# Patient Record
Sex: FEMALE | HISPANIC OR LATINO | Employment: UNEMPLOYED | URBAN - METROPOLITAN AREA
[De-identification: names, ages, dates, MRNs, and addresses within clinical notes are randomized per-mention and may not be internally consistent; named-entity substitution may affect disease eponyms.]

---

## 2022-06-22 ENCOUNTER — APPOINTMENT (OUTPATIENT)
Dept: CT IMAGING | Facility: CLINIC | Age: 32
End: 2022-06-22
Attending: EMERGENCY MEDICINE

## 2022-06-22 ENCOUNTER — HOSPITAL ENCOUNTER (EMERGENCY)
Facility: CLINIC | Age: 32
Discharge: HOME | End: 2022-06-22
Attending: EMERGENCY MEDICINE | Admitting: EMERGENCY MEDICINE

## 2022-06-22 VITALS
DIASTOLIC BLOOD PRESSURE: 58 MMHG | RESPIRATION RATE: 18 BRPM | HEART RATE: 108 BPM | SYSTOLIC BLOOD PRESSURE: 99 MMHG | OXYGEN SATURATION: 100 % | WEIGHT: 141.09 LBS | TEMPERATURE: 98.2 F

## 2022-06-22 DIAGNOSIS — N20.0 KIDNEY STONE: ICD-10-CM

## 2022-06-22 DIAGNOSIS — R10.84 ABDOMINAL PAIN, GENERALIZED: ICD-10-CM

## 2022-06-22 LAB
ALBUMIN SERPL BCG-MCNC: 4.4 G/DL (ref 3.5–5.2)
ALBUMIN UR-MCNC: 70 MG/DL
ALP SERPL-CCNC: 61 U/L (ref 35–104)
ALT SERPL W P-5'-P-CCNC: 16 U/L (ref 10–35)
ANION GAP SERPL CALCULATED.3IONS-SCNC: 13 MMOL/L (ref 7–15)
APPEARANCE UR: ABNORMAL
AST SERPL W P-5'-P-CCNC: 20 U/L (ref 10–35)
BASOPHILS # BLD AUTO: 0 10E3/UL (ref 0–0.2)
BASOPHILS NFR BLD AUTO: 0 %
BILIRUB SERPL-MCNC: 0.8 MG/DL
BILIRUB UR QL STRIP: NEGATIVE
BUN SERPL-MCNC: 6.6 MG/DL (ref 6–20)
CALCIUM SERPL-MCNC: 9.2 MG/DL (ref 8.6–10)
CHLORIDE SERPL-SCNC: 104 MMOL/L (ref 98–107)
COLOR UR AUTO: YELLOW
CREAT SERPL-MCNC: 0.6 MG/DL (ref 0.51–0.95)
DEPRECATED HCO3 PLAS-SCNC: 21 MMOL/L (ref 22–29)
EOSINOPHIL # BLD AUTO: 0 10E3/UL (ref 0–0.7)
EOSINOPHIL NFR BLD AUTO: 0 %
ERYTHROCYTE [DISTWIDTH] IN BLOOD BY AUTOMATED COUNT: 11.3 % (ref 10–15)
GFR SERPL CREATININE-BSD FRML MDRD: >90 ML/MIN/1.73M2
GLUCOSE SERPL-MCNC: 133 MG/DL (ref 70–99)
GLUCOSE UR STRIP-MCNC: 50 MG/DL
HCG UR QL: NEGATIVE
HCT VFR BLD AUTO: 41.6 % (ref 35–47)
HGB BLD-MCNC: 14.3 G/DL (ref 11.7–15.7)
HGB UR QL STRIP: NEGATIVE
HOLD SPECIMEN: NORMAL
IMM GRANULOCYTES # BLD: 0.1 10E3/UL
IMM GRANULOCYTES NFR BLD: 1 %
INTERNAL QC OK POCT: NORMAL
KETONES UR STRIP-MCNC: 150 MG/DL
LEUKOCYTE ESTERASE UR QL STRIP: NEGATIVE
LIPASE SERPL-CCNC: 31 U/L (ref 13–60)
LYMPHOCYTES # BLD AUTO: 0.6 10E3/UL (ref 0.8–5.3)
LYMPHOCYTES NFR BLD AUTO: 5 %
MCH RBC QN AUTO: 31.6 PG (ref 26.5–33)
MCHC RBC AUTO-ENTMCNC: 34.4 G/DL (ref 31.5–36.5)
MCV RBC AUTO: 92 FL (ref 78–100)
MONOCYTES # BLD AUTO: 0.6 10E3/UL (ref 0–1.3)
MONOCYTES NFR BLD AUTO: 5 %
MUCOUS THREADS #/AREA URNS LPF: PRESENT /LPF
NEUTROPHILS # BLD AUTO: 10.6 10E3/UL (ref 1.6–8.3)
NEUTROPHILS NFR BLD AUTO: 89 %
NITRATE UR QL: NEGATIVE
NRBC # BLD AUTO: 0 10E3/UL
NRBC BLD AUTO-RTO: 0 /100
PH UR STRIP: 6 [PH] (ref 5–7)
PLATELET # BLD AUTO: 161 10E3/UL (ref 150–450)
POCT KIT EXPIRATION DATE: NORMAL
POCT KIT LOT NUMBER: NORMAL
POTASSIUM SERPL-SCNC: 3.3 MMOL/L (ref 3.4–4.5)
PROT SERPL-MCNC: 7.3 G/DL (ref 6.4–8.3)
RBC # BLD AUTO: 4.52 10E6/UL (ref 3.8–5.2)
RBC URINE: <1 /HPF
SODIUM SERPL-SCNC: 138 MMOL/L (ref 136–145)
SP GR UR STRIP: 1.03 (ref 1–1.03)
SQUAMOUS EPITHELIAL: 10 /HPF
UROBILINOGEN UR STRIP-MCNC: NORMAL MG/DL
WBC # BLD AUTO: 11.9 10E3/UL (ref 4–11)
WBC URINE: 3 /HPF

## 2022-06-22 PROCEDURE — 250N000011 HC RX IP 250 OP 636: Performed by: EMERGENCY MEDICINE

## 2022-06-22 PROCEDURE — 99285 EMERGENCY DEPT VISIT HI MDM: CPT | Mod: 25 | Performed by: EMERGENCY MEDICINE

## 2022-06-22 PROCEDURE — 74177 CT ABD & PELVIS W/CONTRAST: CPT | Mod: 26 | Performed by: RADIOLOGY

## 2022-06-22 PROCEDURE — 99284 EMERGENCY DEPT VISIT MOD MDM: CPT | Performed by: EMERGENCY MEDICINE

## 2022-06-22 PROCEDURE — 83690 ASSAY OF LIPASE: CPT | Performed by: EMERGENCY MEDICINE

## 2022-06-22 PROCEDURE — 96361 HYDRATE IV INFUSION ADD-ON: CPT | Performed by: EMERGENCY MEDICINE

## 2022-06-22 PROCEDURE — 80053 COMPREHEN METABOLIC PANEL: CPT | Performed by: EMERGENCY MEDICINE

## 2022-06-22 PROCEDURE — 85025 COMPLETE CBC W/AUTO DIFF WBC: CPT | Performed by: EMERGENCY MEDICINE

## 2022-06-22 PROCEDURE — 96374 THER/PROPH/DIAG INJ IV PUSH: CPT | Mod: 59 | Performed by: EMERGENCY MEDICINE

## 2022-06-22 PROCEDURE — 81025 URINE PREGNANCY TEST: CPT | Performed by: EMERGENCY MEDICINE

## 2022-06-22 PROCEDURE — 36415 COLL VENOUS BLD VENIPUNCTURE: CPT | Performed by: EMERGENCY MEDICINE

## 2022-06-22 PROCEDURE — 74177 CT ABD & PELVIS W/CONTRAST: CPT

## 2022-06-22 PROCEDURE — 81001 URINALYSIS AUTO W/SCOPE: CPT | Performed by: EMERGENCY MEDICINE

## 2022-06-22 PROCEDURE — 258N000003 HC RX IP 258 OP 636: Performed by: EMERGENCY MEDICINE

## 2022-06-22 PROCEDURE — 250N000009 HC RX 250: Performed by: EMERGENCY MEDICINE

## 2022-06-22 RX ORDER — KETOROLAC TROMETHAMINE 15 MG/ML
10 INJECTION, SOLUTION INTRAMUSCULAR; INTRAVENOUS ONCE
Status: COMPLETED | OUTPATIENT
Start: 2022-06-22 | End: 2022-06-22

## 2022-06-22 RX ORDER — SODIUM CHLORIDE 9 MG/ML
INJECTION, SOLUTION INTRAVENOUS CONTINUOUS
Status: DISCONTINUED | OUTPATIENT
Start: 2022-06-22 | End: 2022-06-22 | Stop reason: HOSPADM

## 2022-06-22 RX ORDER — IOPAMIDOL 755 MG/ML
80 INJECTION, SOLUTION INTRAVASCULAR ONCE
Status: COMPLETED | OUTPATIENT
Start: 2022-06-22 | End: 2022-06-22

## 2022-06-22 RX ORDER — ONDANSETRON 2 MG/ML
4 INJECTION INTRAMUSCULAR; INTRAVENOUS EVERY 30 MIN PRN
Status: DISCONTINUED | OUTPATIENT
Start: 2022-06-22 | End: 2022-06-22 | Stop reason: HOSPADM

## 2022-06-22 RX ADMIN — SODIUM CHLORIDE, PRESERVATIVE FREE 72 ML: 5 INJECTION INTRAVENOUS at 19:05

## 2022-06-22 RX ADMIN — SODIUM CHLORIDE 1000 ML: 9 INJECTION, SOLUTION INTRAVENOUS at 17:27

## 2022-06-22 RX ADMIN — IOPAMIDOL 80 ML: 755 INJECTION, SOLUTION INTRAVENOUS at 19:05

## 2022-06-22 RX ADMIN — KETOROLAC TROMETHAMINE 10 MG: 15 INJECTION, SOLUTION INTRAMUSCULAR; INTRAVENOUS at 18:31

## 2022-06-22 ASSESSMENT — ENCOUNTER SYMPTOMS
ABDOMINAL PAIN: 1
VOMITING: 0
DYSURIA: 0
SORE THROAT: 1
FEVER: 1
NAUSEA: 1
HEMATURIA: 0

## 2022-06-22 NOTE — ED PROVIDER NOTES
[place] EMERGENCY DEPARTMENT ([facility])  6/22/22    History     Chief Complaint   Patient presents with     Abdominal Pain     Dizziness     The history is provided by the patient and medical records.     Nakita Cota is a 31 year old female who presents to the Emergency Department with abdominal pain. Patient reports periumbilical abdominal pain since this morning. Pain radiates to her back. Patient reports she had similar pain about 15 days ago and this resolved on its own after about 2 hours. She has nausea as well as gagging when eating food but no vomiting. Patient endorses subjective fever. Patient's last menstrual period was 5/22. She denies dysuria, hematuria, or vaginal discharge. Patient endorses a little pain in her throat. Patient denies recent illness. No history of abdominal surgery. No recent heavy alcohol use.    Past Medical History  No past medical history on file.  No past surgical history on file.  No current outpatient medications on file.    No Known Allergies  Family History  No family history on file.  Social History       Past medical history, past surgical history, medications, allergies, family history, and social history were reviewed with the patient. No additional pertinent items.       Review of Systems   Constitutional: Positive for fever (subjective).   HENT: Positive for sore throat.    Gastrointestinal: Positive for abdominal pain (periumbilical) and nausea. Negative for vomiting.   Genitourinary: Negative for dysuria, hematuria and vaginal discharge.   All other systems reviewed and are negative.    A complete review of systems was performed with pertinent positives and negatives noted in the HPI, and all other systems negative.    Physical Exam   BP: 99/58  Pulse: 108  Temp: 98.2  F (36.8  C)  Resp: 18  Weight: 64 kg (141 lb 1.5 oz)  SpO2: 100 %  Physical Exam  Constitutional:       General: She is not in acute distress.     Appearance: She is  well-developed. She is not ill-appearing, toxic-appearing or diaphoretic.   HENT:      Head: Normocephalic and atraumatic.   Cardiovascular:      Rate and Rhythm: Normal rate and regular rhythm.      Heart sounds: Normal heart sounds.   Pulmonary:      Effort: Pulmonary effort is normal. No respiratory distress.      Breath sounds: Normal breath sounds.   Abdominal:      General: There is no distension.      Palpations: Abdomen is soft.      Tenderness: There is abdominal tenderness in the suprapubic area. There is no guarding or rebound.      Hernia: No hernia is present.   Musculoskeletal:         General: No tenderness.      Cervical back: Normal range of motion.   Skin:     General: Skin is warm and dry.   Neurological:      Mental Status: She is alert and oriented to person, place, and time.   Psychiatric:         Behavior: Behavior normal.         Thought Content: Thought content normal.         ED Course   5:13 PM  The patient was seen and examined by Iraida Wheat MD in Room ED12.      Procedures       The medical record was reviewed and interpreted.  Current labs reviewed and interpreted.     Results for orders placed or performed during the hospital encounter of 06/22/22   CT Abdomen Pelvis w Contrast     Status: None (Preliminary result)    Impression    RESIDENT PRELIMINARY INTERPRETATION  IMPRESSION:   Two nonobstructive 5 mm renal calculi in the interpolar left kidney.  No other acute findings.   [place] Draw     Status: None    Narrative    The following orders were created for panel order [place] Draw.  Procedure                               Abnormality         Status                     ---------                               -----------         ------                     Extra Blue Top Tube[[id number]]                              Final result               Extra Red Top Tube[[id number]]                               Final result               Extra Green Top (Lithium...[[id number]]                       Final result               Extra Purple Top Tube[[id number]]                            Final result                 Please view results for these tests on the individual orders.   Extra Blue Top Tube     Status: None   Result Value Ref Range    Hold Specimen JIC    Extra Red Top Tube     Status: None   Result Value Ref Range    Hold Specimen JIC    Extra Green Top (Lithium Heparin) Tube     Status: None   Result Value Ref Range    Hold Specimen JIC    Extra Purple Top Tube     Status: None   Result Value Ref Range    Hold Specimen JIC    Comprehensive metabolic panel     Status: Abnormal   Result Value Ref Range    Sodium 138 136 - 145 mmol/L    Potassium 3.3 (L) 3.4 - 4.5 mmol/L    Creatinine 0.60 0.51 - 0.95 mg/dL    Urea Nitrogen 6.6 6.0 - 20.0 mg/dL    Chloride 104 98 - 107 mmol/L    Carbon Dioxide (CO2) 21 (L) 22 - 29 mmol/L    Anion Gap 13 7 - 15 mmol/L    Glucose 133 (H) 70 - 99 mg/dL    Calcium 9.2 8.6 - 10.0 mg/dL    Protein Total 7.3 6.4 - 8.3 g/dL    Albumin 4.4 3.5 - 5.2 g/dL    Bilirubin Total 0.8 <=1.2 mg/dL    Alkaline Phosphatase 61 35 - 104 U/L    AST 20 10 - 35 U/L    ALT 16 10 - 35 U/L    GFR Estimate >90 >60 mL/min/1.73m2   Lipase     Status: Normal   Result Value Ref Range    Lipase 31 13 - 60 U/L   UA with Microscopic reflex to Culture     Status: Abnormal    Specimen: Urine, Clean Catch   Result Value Ref Range    Color Urine Yellow Colorless, Straw, Light Yellow, Yellow    Appearance Urine Slightly Cloudy (A) Clear    Glucose Urine 50  (A) Negative mg/dL    Bilirubin Urine Negative Negative    Ketones Urine 150  (A) Negative mg/dL    Specific Gravity Urine 1.033 1.003 - 1.035    Blood Urine Negative Negative    pH Urine 6.0 5.0 - 7.0    Protein Albumin Urine 70  (A) Negative mg/dL    Urobilinogen Urine Normal Normal, 2.0 mg/dL    Nitrite Urine Negative Negative    Leukocyte Esterase Urine Negative Negative    Mucus Urine Present (A) None Seen /LPF    RBC Urine <1 <=2 /HPF    WBC  Urine 3 <=5 /HPF    Squamous Epithelials Urine 10 (H) <=1 /HPF    Narrative    Urine Culture not indicated   CBC with platelets and differential     Status: Abnormal   Result Value Ref Range    WBC Count 11.9 (H) 4.0 - 11.0 10e3/uL    RBC Count 4.52 3.80 - 5.20 10e6/uL    Hemoglobin 14.3 11.7 - 15.7 g/dL    Hematocrit 41.6 35.0 - 47.0 %    MCV 92 78 - 100 fL    MCH 31.6 26.5 - 33.0 pg    MCHC 34.4 31.5 - 36.5 g/dL    RDW 11.3 10.0 - 15.0 %    Platelet Count 161 150 - 450 10e3/uL    % Neutrophils 89 %    % Lymphocytes 5 %    % Monocytes 5 %    % Eosinophils 0 %    % Basophils 0 %    % Immature Granulocytes 1 %    NRBCs per 100 WBC 0 <1 /100    Absolute Neutrophils 10.6 (H) 1.6 - 8.3 10e3/uL    Absolute Lymphocytes 0.6 (L) 0.8 - 5.3 10e3/uL    Absolute Monocytes 0.6 0.0 - 1.3 10e3/uL    Absolute Eosinophils 0.0 0.0 - 0.7 10e3/uL    Absolute Basophils 0.0 0.0 - 0.2 10e3/uL    Absolute Immature Granulocytes 0.1 <=0.4 10e3/uL    Absolute NRBCs 0.0 10e3/uL   hCG qual urine POCT     Status: Normal   Result Value Ref Range    HCG Qual Urine Negative Negative    Internal QC Check POCT Valid Valid    POCT Kit Lot Number [id number]     POCT Kit Expiration Date 08/31/2023    CBC with platelets differential     Status: Abnormal    Narrative    The following orders were created for panel order CBC with platelets differential.  Procedure                               Abnormality         Status                     ---------                               -----------         ------                     CBC with platelets and d...[[id number]]  Abnormal            Final result                 Please view results for these tests on the individual orders.     Medications   0.9% sodium chloride BOLUS (0 mLs Intravenous Stopped 6/22/22 1833)     Followed by   sodium chloride 0.9% infusion (has no administration in time range)   ondansetron (ZOFRAN) injection 4 mg (has no administration in time range)   ketorolac (TORADOL) injection 10 mg (10 mg  Intravenous Given 6/22/22 1831)   iopamidol (ISOVUE-370) solution 80 mL (80 mLs Intravenous Given 6/22/22 1905)   sodium chloride 0.9 % bag 500mL for CT scan flush use (72 mLs Intravenous Given 6/22/22 1905)              Results for orders placed or performed during the hospital encounter of 06/22/22   [place] Draw     Status: None (In process)    Narrative    The following orders were created for panel order [place] Draw.  Procedure                               Abnormality         Status                     ---------                               -----------         ------                     Extra Blue Top Tube[[id number]]                              In process                 Extra Red Top Tube[[id number]]                               In process                 Extra Green Top (Lithium...[[id number]]                      In process                 Extra Purple Top Tube[[id number]]                            In process                   Please view results for these tests on the individual orders.     Medications - No data to display     Assessments & Plan (with Medical Decision Making)   Patient is a young healthy 31-year-old female who presents to the ER complaining of abdominal pain that started acutely this morning.  Patient notes some nausea and some decreased appetite but no fever or vomiting.  Patient here does have some suprapubic tenderness.  No epigastric or right upper quadrant tenderness.  Initially my satisfaction was for a UTI versus an ectopic pregnancy.  Patient UA however shows elevated ketones and signs of dehydration did not show any signs of an infection.  Patient also does not have a positive pregnancy test.  Plan will therefore be to obtain a CT abdomen pelvis for further evaluation.  Patient agrees with plan of care.  Initial differential includes possible appendicitis versus less likely diverticulitis versus epiploic appendagitis versus fluid in her pelvis.    Patient lab work showed mild  leukocytosis but otherwise was stable.  Patient's lipase and electrolytes are normal.  Patient's urine showed ketones but otherwise had no signs of infection.  We obtain a CT abdomen and pelvis because patient is having lower abdominal pain.  Patient's urine pregnancy was normal.  I therefore was not suspecting an ectopic.  Patient was noted to have any vaginal discharge suspect STD or ovarian pathology.  We obtain a CT abdomen pelvis that shows she has 2 retained stones left kidney but otherwise no acute issues.  Possible that patient was passing a small stone that caused her severe pain.  Patient currently has no pain at all.  Patient pain is completely resolved.  Plan will be to discharge the patient home with outpatient follow-up.  Had a long discussion with the patient and the  via the .  Patient stable for discharge    I have reviewed the nursing notes. I have reviewed the findings, diagnosis, plan and need for follow up with the patient.    New Prescriptions    No medications on file       Final diagnoses:   Abdominal pain, generalized   Kidney stone     IJannie, am serving as a trained medical scribe to document services personally performed by Iraida Wheat MD, based on the provider's statements to me.      IIraida MD, was physically present and have reviewed and verified the accuracy of this note documented by Jannie Lockhart.     --  Iraida Wheat MD  [facility] EMERGENCY DEPARTMENT  6/22/2022     Iraida Wheat MD  06/22/22 2039

## 2022-06-22 NOTE — ED TRIAGE NOTES
Ambulatory to triage with c/o generalized abdominal pain radiating to back starting this morning.  Endorses dizziness. Denies N/V. Denies urinary sx. Pain and diaphoretic in triage.    Triage Assessment     Row Name 06/22/22 2174       Triage Assessment (Adult)    Airway WDL WDL       Respiratory WDL    Respiratory WDL WDL       Skin Circulation/Temperature WDL    Skin Circulation/Temperature WDL WDL       Cardiac WDL    Cardiac WDL WDL       Peripheral/Neurovascular WDL    Peripheral Neurovascular WDL WDL       Cognitive/Neuro/Behavioral WDL    Cognitive/Neuro/Behavioral WDL WDL

## 2022-06-22 NOTE — ED NOTES
Spoke to patient via . Pt states she has abdominal pain that goes to her back. She had the same pain 15 days ago, lasted 2 hours, then went away. It came back today. She doesn't think she is pregnant, but she is . Has 1 child, 1 prior pregnancy. Denies urinary or GI symptoms. Has not tried heat or ice for pain. Doesn't think she can give urine sample yet.

## 2022-06-23 NOTE — DISCHARGE INSTRUCTIONS
Your blood work shows no signs of severe infection.     Your urine test shows dehydration but no infection.     Your CT abdomen/pelvis shows you stones in your kidney but none that are moving.     Please make an appointment to follow up with Primary [facility] (phone: [phone]) in 3-5 days even if entirely better.    Return to the ER if symptoms worsen.     Please make an appointment to follow up with [facility] (phone: [phone]) in 2-4 weeks if you want to be see for your left eye.

## 2024-07-05 ENCOUNTER — HOSPITAL ENCOUNTER (EMERGENCY)
Facility: CLINIC | Age: 34
Discharge: HOME | End: 2024-07-05
Attending: EMERGENCY MEDICINE | Admitting: EMERGENCY MEDICINE

## 2024-07-05 ENCOUNTER — APPOINTMENT (OUTPATIENT)
Dept: CT IMAGING | Facility: CLINIC | Age: 34
End: 2024-07-05
Attending: EMERGENCY MEDICINE

## 2024-07-05 VITALS
HEART RATE: 89 BPM | OXYGEN SATURATION: 100 % | SYSTOLIC BLOOD PRESSURE: 104 MMHG | RESPIRATION RATE: 18 BRPM | WEIGHT: 119.05 LBS | HEIGHT: 61 IN | DIASTOLIC BLOOD PRESSURE: 67 MMHG | BODY MASS INDEX: 22.48 KG/M2 | TEMPERATURE: 97.6 F

## 2024-07-05 DIAGNOSIS — H20.9 TRAUMATIC IRITIS: ICD-10-CM

## 2024-07-05 DIAGNOSIS — S05.8X2A BLUNT TRAUMA OF LEFT EYE, INITIAL ENCOUNTER: ICD-10-CM

## 2024-07-05 LAB — HCG UR QL: NEGATIVE

## 2024-07-05 PROCEDURE — 70450 CT HEAD/BRAIN W/O DYE: CPT

## 2024-07-05 PROCEDURE — 250N000013 HC RX MED GY IP 250 OP 250 PS 637: Performed by: EMERGENCY MEDICINE

## 2024-07-05 PROCEDURE — 70450 CT HEAD/BRAIN W/O DYE: CPT | Mod: 26 | Performed by: RADIOLOGY

## 2024-07-05 PROCEDURE — 99285 EMERGENCY DEPT VISIT HI MDM: CPT | Performed by: EMERGENCY MEDICINE

## 2024-07-05 PROCEDURE — 99284 EMERGENCY DEPT VISIT MOD MDM: CPT | Mod: 25 | Performed by: EMERGENCY MEDICINE

## 2024-07-05 PROCEDURE — 70480 CT ORBIT/EAR/FOSSA W/O DYE: CPT | Mod: 26 | Performed by: RADIOLOGY

## 2024-07-05 PROCEDURE — 81025 URINE PREGNANCY TEST: CPT | Performed by: EMERGENCY MEDICINE

## 2024-07-05 PROCEDURE — 70480 CT ORBIT/EAR/FOSSA W/O DYE: CPT

## 2024-07-05 RX ORDER — OXYCODONE HYDROCHLORIDE 5 MG/1
5 TABLET ORAL ONCE
Status: COMPLETED | OUTPATIENT
Start: 2024-07-05 | End: 2024-07-05

## 2024-07-05 RX ORDER — PREDNISOLONE ACETATE 10 MG/ML
1-2 SUSPENSION/ DROPS OPHTHALMIC 4 TIMES DAILY
Qty: 10 ML | Refills: 0 | Status: SHIPPED | OUTPATIENT
Start: 2024-07-05 | End: 2024-07-12

## 2024-07-05 RX ORDER — ATROPINE SULFATE 10 MG/ML
1-2 SOLUTION/ DROPS OPHTHALMIC 2 TIMES DAILY
Qty: 5 ML | Refills: 0 | Status: SHIPPED | OUTPATIENT
Start: 2024-07-05 | End: 2024-07-12

## 2024-07-05 RX ADMIN — OXYCODONE HYDROCHLORIDE 5 MG: 5 TABLET ORAL at 15:29

## 2024-07-05 ASSESSMENT — COLUMBIA-SUICIDE SEVERITY RATING SCALE - C-SSRS
2. HAVE YOU ACTUALLY HAD ANY THOUGHTS OF KILLING YOURSELF IN THE PAST MONTH?: NO
1. IN THE PAST MONTH, HAVE YOU WISHED YOU WERE DEAD OR WISHED YOU COULD GO TO SLEEP AND NOT WAKE UP?: NO
6. HAVE YOU EVER DONE ANYTHING, STARTED TO DO ANYTHING, OR PREPARED TO DO ANYTHING TO END YOUR LIFE?: NO

## 2024-07-05 ASSESSMENT — ACTIVITIES OF DAILY LIVING (ADL)
ADLS_ACUITY_SCORE: 33
ADLS_ACUITY_SCORE: 35

## 2024-07-05 ASSESSMENT — VISUAL ACUITY
OD: 20/30
OS: 20/50

## 2024-07-05 NOTE — ED PROVIDER NOTES
"     Emergency Department Patient Sign-out       Brief HPI and ED course:  Patient is a 33 year old female signed out to me by the previous physician.  See initial ED Provider note for details of the presentation. In brief, 33-year-old Scottish-speaking female presenting to the emergency department due to a left eye injury yesterday that she endorses was from the handle of a mop hitting her eye having some blurriness in her left eye, and pain in her eye.  CT head and orbits is negative.     Vitals:   Patient Vitals for the past 24 hrs:   BP Temp Temp src Pulse Resp SpO2 Height Weight   07/05/24 1430 104/67 97.6  F (36.4  C) Oral 90 22 100 % 1.549 m (5' 1\") 54 kg (119 lb 0.8 oz)       Received Sign-out Plan:    Pending:   -Ophthalmology evaluation    Events after assuming care:  After care was assumed, a focused history and physical was performed. Agree with findings relayed by previous provider.     Evaluated by ophthalmology.  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  Recommend discharge with atropine and prednisolone which she will then follow-up in clinic in 1 week.  CT did demonstrate some tiny focus of air at the margin of the left globe, however on exam, ophthalmology does not believe that she has a globe perforation and this is more likely this is extraglobar     --  Blanca Solis MD   Emergency Medicine         Blanca Solis MD  07/05/24 2021    "

## 2024-07-05 NOTE — ED TRIAGE NOTES
Pt presents with bruising and swelling to left eye that occurred yesterday when she reports she hit her eye with the handle of a mop while she was cleaning her kitchen.  Eye pain increased today.       Triage Assessment (Adult)       Row Name 07/05/24 3908          Triage Assessment    Airway WDL WDL        Respiratory WDL    Respiratory WDL WDL        Skin Circulation/Temperature WDL    Skin Circulation/Temperature WDL X        Cardiac WDL    Cardiac WDL WDL        Peripheral/Neurovascular WDL    Peripheral Neurovascular WDL WDL        Cognitive/Neuro/Behavioral WDL    Cognitive/Neuro/Behavioral WDL WDL

## 2024-07-05 NOTE — ED PROVIDER NOTES
"    [place] EMERGENCY DEPARTMENT ([facility])    7/05/24       ED PROVIDER NOTE    History     Chief Complaint   Patient presents with    Eye Injury     The history is provided by the patient and medical records. A  was used (German).     Nakita Cota is a 33 year old female who presents to the Emergency Department with bruising and swelling to left eye following left eye injury yesterday.    Patient reports that she was cleaning kitchen and had a fall, hitting her left side of her face and left eye to the handle of a mop yesterday morning. She did LOC. She put some ice to the left eye without benefits. She reports developing left eye pain since last night that has been worsening. She describes the pain as inside with pressure-feeling. She also notes some blurriness and pain with moving left eye side to side. She denies bloaters or dark spots in her visions. She denies contact or glasses wearing.     She also notes some headache. She denies injuries to cheek, nose, mouth, chin. She denies neck pain or hurting elsewhere. She denies other medical concerns. She denies chance of pregnancy.      Past Medical History  History reviewed. No pertinent past medical history.  History reviewed. No pertinent surgical history.  No current outpatient medications on file.    No Known Allergies  Family History  History reviewed. No pertinent family history.  Social History   Social History     Tobacco Use    Smoking status: Never    Smokeless tobacco: Never      A medically appropriate review of systems was performed with pertinent positives and negatives noted in the HPI, and all other systems negative.    Physical Exam   BP: 104/67  Pulse: 90  Temp: 97.6  F (36.4  C)  Resp: 22  Height: 154.9 cm (5' 1\")  Weight: 54 kg (119 lb 0.8 oz)  SpO2: 100 %  Physical Exam  Vitals and nursing note reviewed.   Constitutional:       General: She is in acute distress.      Appearance: She is " well-developed and normal weight. She is not diaphoretic.   HENT:      Head: Normocephalic and atraumatic. No raccoon eyes, abrasion, contusion or laceration.      Jaw: There is normal jaw occlusion. No malocclusion.      Nose: Nose normal. No nasal deformity.      Right Nostril: No epistaxis or septal hematoma.      Left Nostril: No epistaxis or septal hematoma.      Right Sinus: No maxillary sinus tenderness or frontal sinus tenderness.      Left Sinus: No maxillary sinus tenderness or frontal sinus tenderness.      Mouth/Throat:      Mouth: Mucous membranes are moist. No injury, lacerations, oral lesions or angioedema.      Dentition: Normal dentition.   Eyes:      General: Lids are normal. No scleral icterus.     Conjunctiva/sclera:      Left eye: Left conjunctiva is injected. No chemosis, exudate or hemorrhage.     Pupils: Pupils are equal, round, and reactive to light.      Slit lamp exam:     Left eye: Photophobia present.      Comments: Left eye mild periorbital ecchymosis. Pain limited exam of EOM but no obvious entrapment. No proptosis. Pterygium in left eye.    Cardiovascular:      Rate and Rhythm: Normal rate.   Pulmonary:      Effort: Pulmonary effort is normal. No respiratory distress.      Breath sounds: No stridor.   Abdominal:      General: There is no distension.   Musculoskeletal:         General: No deformity or signs of injury. Normal range of motion.      Cervical back: Normal range of motion and neck supple. No rigidity or crepitus. No spinous process tenderness or muscular tenderness. Normal range of motion.   Skin:     General: Skin is warm and dry.      Coloration: Skin is not jaundiced or pale.      Findings: No rash.   Neurological:      General: No focal deficit present.      Mental Status: She is alert and oriented to person, place, and time.      GCS: GCS eye subscore is 4. GCS verbal subscore is 5. GCS motor subscore is 6.      Cranial Nerves: No facial asymmetry.      Motor: Motor  function is intact.   Psychiatric:         Behavior: Behavior normal.           ED Course, Procedures, & Data      Procedures         Results for orders placed or performed during the hospital encounter of 07/05/24   HCG qualitative urine (UPT)     Status: Normal   Result Value Ref Range    hCG Urine Qualitative Negative Negative     Medications   oxyCODONE (ROXICODONE) tablet 5 mg (5 mg Oral $Given 7/5/24 0238)     Labs Ordered and Resulted from Time of ED Arrival to Time of ED Departure   HCG QUALITATIVE URINE - Normal       Result Value    hCG Urine Qualitative Negative       CT Orbits wo Contrast    (Results Pending)   CT Head w/o Contrast    (Results Pending)          Critical care was not performed.     Medical Decision Making  The patient's presentation was of high complexity (an acute health issue posing potential threat to life or bodily function).    The patient's evaluation involved:  review of 1 test result(s) ordered prior to this encounter (see separate area of note for details)  ordering and/or review of 3+ test(s) in this encounter (see separate area of note for details)  discussion of management or test interpretation with another health professional (see separate area of note for details)    The patient's management necessitated moderate risk (prescription drug management including medications given in the ED), moderate risk (limitations due to social determinants of health (language barrier)), and further care after sign-out to Dr. Solis (see their note for further management).    Assessment & Plan    Nakita Cota is a 33 year old female who presents to the Emergency Department with bruising and swelling to left eye following left eye injury yesterday.    Ddx: orbital blowout fracture, retroorbital hematoma, ICH, vitreous hemorrhage, facial fracture, concussion    Patient given oxycodone for pain. Imaging discussed with rads and CT head and orbit without contrast deemed appropriate  studies. Ophtho consulted for DFE after imaging.     Patient signed out to oncoming attending who will continue care. Awaiting CT scan and ophtho consult. Please see addendum for results of work up and remaining management.        I have reviewed the nursing notes. I have reviewed the findings, diagnosis, plan and need for follow up with the patient.    New Prescriptions    No medications on file       Final diagnoses:   Blunt trauma of left eye, initial encounter       I, Deepak Looney, am serving as a trained medical scribe to document services personally performed by Lorrie Jonas MD based on the provider's statements to me on July 5, 2024.  This document has been checked and approved by the attending provider.    I, Lorrie Jonas MD, was physically present and have reviewed and verified the accuracy of this note documented by Deepak Looney medical scribe.      Lorrie Jonas MD  [facility] EMERGENCY DEPARTMENT  7/5/2024        Lorrie Jonas MD  07/05/24 2705

## 2024-07-06 NOTE — CONSULTS
OphthalmologyConsult    July 5, 2024    Nakita Cota MRN:[id number] YOB: 1990  Date of Admission:7/5/2024    I was asked to see this patient by Dr. Solis regarding a trauma to the left eye.    Assessment and Recommendations:  Nakita Cota is a 33 year old female with traumatic iritis of the left eye.     # Traumatic iritis left eye    - Prednisolone 1 drop 4 times daily   - Atropine 1 drop 2 times daily   - Follow up in 1 week    # Pterygium left eye           - Patient interested in surgery, will set up appointment for evaluation once right eye is healed    The findings and plan were discussed with the family members present.    -------------------------  CC:  Left eye pain  HPI: Nakita Cota is a 33 year old female who presents to the Emergency Department with bruising and swelling to left eye following left eye injury yesterday.  Patient reports that she was cleaning kitchen and had a fall, hitting her left side of her face and left eye to the handle of a mop yesterday morning. She put some ice to the left eye without benefits. She reports developing left eye pain since last night that has been worsening. She describes the pain as inside with pressure-feeling. She also notes some blurriness and pain with moving left eye side to side. She denies bloaters or dark spots in her visions. She denies contact or glasses wearing.    She also notes some headache. She denies injuries to cheek, nose, mouth, chin. She denies neck pain or hurting elsewhere. She denies other medical concerns. She denies chance of pregnancy.  Ocular Hx: None  Ocular Rx:  None  Med Hx: History reviewed. No pertinent past medical history.  Med Rx:   No current facility-administered medications for this encounter.     Current Outpatient Medications   Medication Sig Dispense Refill    atropine 1 % ophthalmic solution Place 1-2 drops Into the left eye 2 times daily for 7 days 5 mL 0    prednisoLONE  "acetate (PRED FORTE) 1 % ophthalmic suspension Place 1-2 drops Into the left eye 4 times daily for 7 days 10 mL 0     Inpatient Medications:   No current facility-administered medications for this encounter.     Allergies: No Known Allergies  Social Hx:   Social History     Tobacco Use    Smoking status: Never    Smokeless tobacco: Never     Fam Hx:  History reviewed. No pertinent family history.  Exam: /67   Pulse 89   Temp 97.6  F (36.4  C) (Oral)   Resp 18   Ht 1.549 m (5' 1\")   Wt 54 kg (119 lb 0.8 oz)   LMP 07/01/2024 (Approximate)   SpO2 100%   BMI 22.49 kg/m   AAOx3  VA : RE 20/20 , LE 20/40. Both assessed using Snellen near card at the bedside.  Motility: Full  Confrontational Visual Field: Full   Pupils: Equal and reactive to light and accomodation with no afferent pupillary defect.  IOP RE  20 LE 14 by tonopen (<5% error).   External/Slit Lamp exam   RIGHT   Lids/lashes: No abnormality   Conj/Sclera: White and quiet   Cornea:  Clear   Ant Chamber:  Deep and quiet   Lens: Clear  LEFT   Lids/lashes: No abnormality   Conj/Sclera: 1+ injection     Cornea:  Clear   Ant Chamber:  1 + cell trace flare   Lens:Clear    Dilated Fundus Exam  Eyes Dilated? Yes   Time: With Phenylephrine 2.5% and Mydriacyl 1%   (Normally, dilation with the above lasts about 4-6 hours)  RIGHT:   Anterior vitreous: clear   Media: clear   Optic nerve: normal contours and size   Cup to Disc Ratio: 0.4   Macula: flat and no pigment abnormality   Vessels: normal caliber and distribution   Retinal Periphery: no holes or tears identified  LEFT:   Anterior vitreous: clear   Media: clear   Optic nerve: normal contours and size   Cup to Disc Ratio: 0.4   Macula: flat and no pigment abnormality   Vessels: normal caliber and distribution   Retinal Periphery: no holes or tears identified    ATTESTATION     Attending Physician Attestation:      Complete documentation of historical and exam elements from today's encounter can be found in " the full encounter summary report (not reduplicated in this progress note).  I personally obtained the chief complaint(s) and history of present illness.  I confirmed and edited as necessary the review of systems, past medical/surgical history, family history, social history, and examination findings as documented by others; and I examined the patient myself.  I personally reviewed the relevant tests, images, and reports as documented above.  I formulated and edited as necessary the assessment and plan and discussed the findings and management plan with the patient and family    Jomar Carcamo MD  PGY-5 Vitreo-retina surgery Fellow  Department of Ophthalmology   [facility]

## 2024-07-06 NOTE — DISCHARGE INSTRUCTIONS
You were seen in the emergency department due to an injury to your eye.  You were found to have a traumatic injury to your iris called traumatic iritis.  This can cause pain with looking at the light.  You are given medication which will help to keep your eye from constricting.  Because of this you will be very sensitive to the light we recommend that you wear sunglasses anytime you are out during the daytime.  You also were given a steroid that you need to use 4 times a day.  Use these as prescribed and follow-up with the eye clinic in 1 week.

## 2024-07-07 ENCOUNTER — HOSPITAL ENCOUNTER (EMERGENCY)
Facility: CLINIC | Age: 34
Discharge: HOME | End: 2024-07-07
Attending: INTERNAL MEDICINE | Admitting: INTERNAL MEDICINE

## 2024-07-07 VITALS
SYSTOLIC BLOOD PRESSURE: 105 MMHG | OXYGEN SATURATION: 100 % | DIASTOLIC BLOOD PRESSURE: 67 MMHG | RESPIRATION RATE: 14 BRPM | HEART RATE: 74 BPM | TEMPERATURE: 98.1 F

## 2024-07-07 DIAGNOSIS — H20.9 TRAUMATIC IRITIS: ICD-10-CM

## 2024-07-07 PROCEDURE — 250N000013 HC RX MED GY IP 250 OP 250 PS 637: Performed by: INTERNAL MEDICINE

## 2024-07-07 PROCEDURE — 99284 EMERGENCY DEPT VISIT MOD MDM: CPT | Mod: 4UV | Performed by: OPHTHALMOLOGY

## 2024-07-07 PROCEDURE — 99284 EMERGENCY DEPT VISIT MOD MDM: CPT | Performed by: INTERNAL MEDICINE

## 2024-07-07 PROCEDURE — 99283 EMERGENCY DEPT VISIT LOW MDM: CPT | Performed by: INTERNAL MEDICINE

## 2024-07-07 RX ORDER — HYDROCODONE BITARTRATE AND ACETAMINOPHEN 5; 325 MG/1; MG/1
1 TABLET ORAL ONCE
Status: COMPLETED | OUTPATIENT
Start: 2024-07-07 | End: 2024-07-07

## 2024-07-07 RX ADMIN — HYDROCODONE BITARTRATE AND ACETAMINOPHEN 1 TABLET: 5; 325 TABLET ORAL at 11:57

## 2024-07-07 ASSESSMENT — COLUMBIA-SUICIDE SEVERITY RATING SCALE - C-SSRS
6. HAVE YOU EVER DONE ANYTHING, STARTED TO DO ANYTHING, OR PREPARED TO DO ANYTHING TO END YOUR LIFE?: NO
2. HAVE YOU ACTUALLY HAD ANY THOUGHTS OF KILLING YOURSELF IN THE PAST MONTH?: NO
1. IN THE PAST MONTH, HAVE YOU WISHED YOU WERE DEAD OR WISHED YOU COULD GO TO SLEEP AND NOT WAKE UP?: NO

## 2024-07-07 ASSESSMENT — ENCOUNTER SYMPTOMS
DIFFICULTY URINATING: 0
VOMITING: 0
CONFUSION: 0
WEAKNESS: 0
CHILLS: 0
PHOTOPHOBIA: 1
FEVER: 0
COLOR CHANGE: 1
NUMBNESS: 0
EYE REDNESS: 1
RHINORRHEA: 0
EYE PAIN: 1
NAUSEA: 0
COUGH: 0

## 2024-07-07 ASSESSMENT — VISUAL ACUITY
OS: LESS THAN 20/400
OD: 20/70

## 2024-07-07 ASSESSMENT — ACTIVITIES OF DAILY LIVING (ADL)
ADLS_ACUITY_SCORE: 35
ADLS_ACUITY_SCORE: 35

## 2024-07-07 NOTE — ED PROVIDER NOTES
ED Provider Note  [facility]      History     Chief Complaint   Patient presents with    Eye Injury     HPI  Nakita Cota is a 33 year old female who presents with worsening left eye pain. She was seen in the ED 2 days ago following trauma to the left eye from the handle of a mop. She was seen by ophthalmology and felt to have evolving post traumatic iritis. She was treated with atropine and steroid eye drops. She now presents with worsening redness, photophobia and left eye pain. Vision is blurred.    Past Medical History  No past medical history on file.  No past surgical history on file.  atropine 1 % ophthalmic solution  prednisoLONE acetate (PRED FORTE) 1 % ophthalmic suspension      No Known Allergies  Family History  No family history on file.  Social History   Social History     Tobacco Use    Smoking status: Never    Smokeless tobacco: Never      Review of Systems   Constitutional:  Negative for chills and fever.   HENT:  Negative for congestion and rhinorrhea.    Eyes:  Positive for photophobia, pain, redness and visual disturbance.   Respiratory:  Negative for cough.    Cardiovascular:  Negative for chest pain.   Gastrointestinal:  Negative for nausea and vomiting.   Genitourinary:  Negative for difficulty urinating.   Skin:  Positive for color change.   Neurological:  Negative for weakness and numbness.   Psychiatric/Behavioral:  Negative for confusion.        Physical Exam   BP: 105/67  Pulse: 74  Temp: 98.1  F (36.7  C)  Resp: 14  SpO2: 100 %  Physical Exam  Vitals and nursing note reviewed.   Constitutional:       General: She is in acute distress.      Appearance: Normal appearance.   HENT:      Head: Normocephalic and atraumatic.      Right Ear: External ear normal.      Left Ear: External ear normal.      Nose: Nose normal.      Mouth/Throat:      Mouth: Mucous membranes are moist.   Eyes:      General: Gaze aligned appropriately.      Extraocular Movements:       Right eye: Normal extraocular motion and no nystagmus.      Left eye: Normal extraocular motion and no nystagmus.      Conjunctiva/sclera:      Left eye: Left conjunctiva is injected. Chemosis and hemorrhage present.      Pupils:      Left eye: Pupil is not reactive.      Funduscopic exam:        Left eye: No hemorrhage or papilledema.      Slit lamp exam:     Left eye: No hyphema.      Comments: Left periorbital ecchymosis and tenderness/   Cardiovascular:      Rate and Rhythm: Normal rate and regular rhythm.      Pulses: Normal pulses.      Heart sounds: Normal heart sounds.   Pulmonary:      Effort: Pulmonary effort is normal.   Abdominal:      Tenderness: There is no abdominal tenderness.   Musculoskeletal:      Cervical back: Normal range of motion.   Skin:     General: Skin is warm and dry.      Findings: Bruising present.   Neurological:      General: No focal deficit present.      Mental Status: She is alert and oriented to person, place, and time.   Psychiatric:         Mood and Affect: Mood normal.         Behavior: Behavior normal.           ED Course, Procedures, & Data      Procedures       Labs/Imaging    No results found for this or any previous visit (from the past 24 hour(s)).       No results found for any visits on 07/07/24.  Medications - No data to display  Labs Ordered and Resulted from Time of ED Arrival to Time of ED Departure - No data to display  No orders to display          Critical care was not performed.     Medical Decision Making  The patient's presentation was of moderate complexity (an acute complicated injury).    The patient's evaluation involved:  discussion of management or test interpretation with another health professional (Ophthalmology)    The patient's management necessitated only low risk treatment.    Assessment & Plan    Impression:  Young woman with recent left orbital trauma presents with worsening pain and photophobia in the left eye. She was seen 2 days ago by  ophthalmology who again saw her today. They do not feel there has been any significant worsening of the condition. They have prescribed increased frequency of the prednisolone and will see her in clionic in a few days.    I have reviewed the nursing notes. I have reviewed the findings, diagnosis, plan and need for follow up with the patient.    New Prescriptions    No medications on file       Final diagnoses:   None       Perico Bangura  [facility] EMERGENCY DEPARTMENT  7/7/2024     Perico Bangura MD  07/07/24 3511

## 2024-07-07 NOTE — ED TRIAGE NOTES
Triage Assessment (Adult)       Row Name 07/07/24 1106          Triage Assessment    Airway WDL WDL        Respiratory WDL    Respiratory WDL WDL        Cardiac WDL    Cardiac WDL WDL        Peripheral/Neurovascular WDL    Peripheral Neurovascular WDL WDL

## 2024-07-07 NOTE — ED TRIAGE NOTES
Pt presents to the emergency department c/o increased pain in L eye. Pupils are uneven and affected eye being 6mm dilated but reactive to light. Eye is reddened with bruising surrounding. Pt seen on Friday for eval and sent home but, returns with increased pain and vision changes. Per pt injury was sustained from a fall at home.     Triage Assessment (Adult)       Row Name 07/07/24 1106          Triage Assessment    Airway WDL WDL        Respiratory WDL    Respiratory WDL WDL        Cardiac WDL    Cardiac WDL WDL        Peripheral/Neurovascular WDL    Peripheral Neurovascular WDL WDL

## 2024-07-07 NOTE — DISCHARGE INSTRUCTIONS
Continue the medications prescribed by the eye doctor.  Please make an appointment to follow up with Eye Clinic (phone: [phone]) in 2-5 days.

## 2024-07-07 NOTE — CONSULTS
OPHTHALMOLOGY CONSULT NOTE      Patient: Nakita Cota  Consulted by: [facility]  Reason for Consult: Worsening eye pain and redness  Date of initial evaluation: 07/07/24    ASSESSMENT/PLAN:     Nakita Cota is a 33 year old female who presents with     # Left, traumatic iritis: Fell and struck the left eye on the handle of a mop 7/4/24 and was diagnosed with traumatic iritis by Dr. Escobar 7/5/24. She has had worsening pain while on PF QID and atropine BID with worsened anterior chamber inflammation. No signs of open globe, corneal abrasion, or other acute pathology to explain symptoms.     RECOMMENDATIONS:  - Increase PF to q2hrs left eye  - Continue atropine daily left eye     Follow up 3-5 days VT AC check.    It is our pleasure to participate in this patient's care and treatment. Please contact us with any further questions or concerns.    Patient seen and discussed with senior resident Darryn Denise MD.    John Alvarez MD  Resident Physician, PGY-2  Department of Ophthalmology      HISTORY OF PRESENTING ILLNESS:     Nakita Cota is a 33 year old female who presents for persistent left eye pain since Friday. She has had a strong pain inside the eye with a new burning pain as well. She has been very photosensitive.     Has been taking atropine BID and PF QID left eye. Pain has been worsening. Vision is still blurry. No vision loss.       Review of systems were otherwise negative except for that which has been stated above.      OCULAR/MEDICAL/SURGICAL HISTORIES:     Past Ocular History:  Last eye exam: 7/5/24  Prior eye surgery/laser: none  Contact lens wear: none  Glasses: none  Eyedrops: PF QID and atropine daily    Family History:  Not discussed    Social History:  Not discussed    No past medical history on file.    No past surgical history on file.    EXAMINATION:     Base Eye Exam       Visual Acuity (Snellen - Linear)         Right Left    Near cc  20/70    Difficult compliance             Tonometry (Tonopen, 1:20 PM)         Right Left    Pressure  19              Pupils         Pupils Shape React APD    Right PERRL Round Brisk None    Left PERRL Round Brisk None              Extraocular Movement         Right Left     Full Full                  Slit Lamp and Fundus Exam       External Exam         Right Left    External Normal periorbital ecchymosis and edema              Slit Lamp Exam         Right Left    Lids/Lashes Normal Eyelid edema and ecchymosis , no lacerations    Conjunctiva/Sclera White and quiet Tr injection    Cornea Clear Clear, no fluorescein staining    Anterior Chamber Deep and quiet Deep, 1+ cell    Iris Round and reactive Dilated    Lens Clear Clear    Anterior Vitreous Normal Normal              Fundus Exam         Right Left    Disc  Normal    C/D Ratio  0.4    Macula  Normal    Vessels  Normal    Periphery  Normal                    Labs/Studies/Imaging Performed:  None

## 2024-07-08 ENCOUNTER — TELEPHONE (OUTPATIENT)
Dept: OPHTHALMOLOGY | Facility: CLINIC | Age: 34
End: 2024-07-08

## 2024-07-08 NOTE — TELEPHONE ENCOUNTER
I spoke to the patient through  services and scheduled her ED follow up appointment for traumatic iritis.

## 2024-07-10 ENCOUNTER — OFFICE VISIT (OUTPATIENT)
Dept: OPHTHALMOLOGY | Facility: CLINIC | Age: 34
End: 2024-07-10
Attending: OPHTHALMOLOGY

## 2024-07-10 DIAGNOSIS — S05.8X2A BLUNT TRAUMA OF LEFT EYE, INITIAL ENCOUNTER: ICD-10-CM

## 2024-07-10 DIAGNOSIS — H20.9 TRAUMATIC IRITIS: Primary | ICD-10-CM

## 2024-07-10 PROCEDURE — 99214 OFFICE O/P EST MOD 30 MIN: CPT

## 2024-07-10 PROCEDURE — 99204 OFFICE O/P NEW MOD 45 MIN: CPT

## 2024-07-10 ASSESSMENT — TONOMETRY
OS_IOP_MMHG: 24
OD_IOP_MMHG: 20
OS_IOP_MMHG: 25
OD_IOP_MMHG: 23
IOP_METHOD: TONOPEN
IOP_METHOD: TONOPEN

## 2024-07-10 ASSESSMENT — CONF VISUAL FIELD
METHOD: COUNTING FINGERS
OS_INFERIOR_NASAL_RESTRICTION: 3
OS_SUPERIOR_TEMPORAL_RESTRICTION: 3
OS_INFERIOR_TEMPORAL_RESTRICTION: 3
OS_SUPERIOR_NASAL_RESTRICTION: 3

## 2024-07-10 ASSESSMENT — SLIT LAMP EXAM - LIDS: COMMENTS: NORMAL

## 2024-07-10 ASSESSMENT — VISUAL ACUITY
OD_SC: 20/20
OS_SC: 20/80
OD_SC+: -2
METHOD: SNELLEN - LINEAR
OS_SC+: -1

## 2024-07-10 ASSESSMENT — CUP TO DISC RATIO: OS_RATIO: 0.4

## 2024-07-10 ASSESSMENT — EXTERNAL EXAM - RIGHT EYE: OD_EXAM: NORMAL

## 2024-07-10 NOTE — PATIENT INSTRUCTIONS
Deje de usar el drom ocular de atropina. Esta es la gota para los ojos con la tapa yumiko.    Instrucciones para el colirio de prednisolona (aby dodd):  - Continúe usando el colirio cuatro veces al día anatoliy cuatro días.  - Luego, reduzca a keisha veces al día anatoliy keisha días.  - Luego, reduzca a dos veces al día anatoliy dos días.  - Luego, disminuya a zhane vez al día anatoliy un día.   - Luego, DEJE de usar shruthi colirio.    Empiece a utilizar lágrimas artificiales cuatro veces al día en ambos ojos. (Refresh, Genteal)

## 2024-07-10 NOTE — NURSING NOTE
Chief Complaints and History of Present Illnesses   Patient presents with    Iritis Evaluation     Chief Complaint(s) and History of Present Illness(es)       Iritis Evaluation              Laterality: left eye    Associated symptoms: dryness, eye pain, tearing, floaters and itching.  Negative for redness, flashes and burning    Pain scale: 0/10              Comments    Nakita is here from ED (7-7)  for evaluation of traumatic iritis due to blunt trauma of left eye. She says vision is blurry left eye. She was given Atropine and Prednisolone to use in Left eye. She wears OTC glasses with tint due to a growth on left conj that has been present for about a year.     Jamaal Lai COT 8:47 AM July 10, 2024

## 2024-07-10 NOTE — PROGRESS NOTES
Ophthalmology Acute Clinic     Chief Complaint(s) and History of Present Illness(es)       Iritis Evaluation              Laterality: left eye    Associated symptoms: dryness, eye pain, tearing, floaters and itching.  Negative for redness, flashes and burning    Pain scale: 0/10              Comments    Nakita is here from ED (7-7)  for evaluation of traumatic iritis due to blunt trauma of left eye. She says vision is blurry left eye. She was given Atropine and Prednisolone to use in Left eye. She wears OTC glasses with tint due to a growth on left conj that has been present for about a year.     Jamaal Lai COT 8:47 AM July 10, 2024                       Reviewed technician note and agree with the documented history. Additions and modifications are noted in the following HPI.     HPI:   Nakita Cota is a 33 year old female who presents for follow-up of traumatic iritis OS diagnosed on 7/5/24. She had interval worsening of pain and presented to the ED again on 7/7/24. Pred Forte was increased to q2h, atropine continued. She presents today for follow-up.    Initial symptoms included left eye pain, photophobia, headache, and blurred vision.     Current drops include prednisolone four times per day and atropine twice per day. She is using both in the left eye only.    Today, she notes resolution of eye pain. There is still a bit of light sensitivity. She feels the vision has improved a little bit but still blurry.     Past Ocular history:   - Outpatient eye care provider:None  - Last eye exam: 8 months ago, in [place]  - Glasses:None  - Contact lens wear: None  - Ocular Surgical History: None  - Current Eye drops: None    PMH:   History reviewed. No pertinent past medical history.     FH: No glaucoma, AMD, or blindness    Review of systems for the eyes was negative other than the pertinent positives/negatives listed in the HPI.      Imaging:   None       Base Eye Exam       Visual Acuity (Snellen - Linear)          Right Left    Dist sc 20/20 -2 20/80 -1    Dist ph sc  NI              Tonometry (Tonopen, 8:54 AM)         Right Left    Pressure 23 24              Tonometry #2 (Tonopen, 9:10 AM)         Right Left    Pressure 20 25              Pupils         Dark Light Shape React    Right 4 3 Round Brisk    Left   dilated               Visual Fields (Counting fingers)         Left Right    Restrictions Partial outer superior temporal, inferior temporal, superior nasal, inferior nasal deficiencies               Extraocular Movement         Right Left     Full Full              Neuro/Psych       Oriented x3: Yes    Mood/Affect: Normal                  Slit Lamp and Fundus Exam       External Exam         Right Left    External Normal Minimal inferior perioribital ecchymosis              Slit Lamp Exam         Right Left    Lids/Lashes Normal Slight eyelid edema    Conjunctiva/Sclera White and quiet Nasal pterygium with 2.3 mm encroachment onto cornea    Cornea Clear Clear    Anterior Chamber Deep and quiet Deep, tr cell    Iris Round and reactive Dilated    Lens Clear Clear              Fundus Exam         Right Left    Vitreous  Normal    Disc  Normal    C/D Ratio  0.4    Macula  Normal    Vessels  Normal    Periphery  Normal                     Assessment & Plan      Nakita Cota is a 33 year old female with the following diagnoses:   1. Blunt trauma of left eye, initial encounter    2. Traumatic iritis         # Left traumatic iritis, improving  - Taper Pred Forte QID x 4 days, TID x 3 days, BID x 2 days, daily x 1 day, then stop   - Discontinue atropine  - Gonioscopy at next visit  - Follow-up 2 weeks    # Pterygium left eye  - She would like to follow-up in our system for comprehensive eye care and is also interested in evaluation for pterygium removal.     # Dry eyes  - Start AT's QID OU    Patient disposition:   Return in about 2 weeks (around 7/24/2024) for VT, gonioscopy.    Patient seen with   Ted    Thank you for entrusting us with your care  Geoff Delcid MD  Resident Physician, PGY-2  Department of Ophthalmology  07/10/24 7:53 AM    Attending Physician Attestation:  Complete documentation of historical and exam elements from today's encounter can be found in the full encounter summary report (not reduplicated in this progress note).  I personally obtained the chief complaint(s) and history of present illness.  I confirmed and edited as necessary the review of systems, past medical/surgical history, family history, social history, and examination findings as documented by others; and I examined the patient myself.  I personally reviewed the relevant tests, images, and reports as documented above.  I formulated and edited as necessary the assessment and plan and discussed the findings and management plan with the patient and family.  - Roger Jean MD

## 2024-12-27 ENCOUNTER — HOSPITAL ENCOUNTER (EMERGENCY)
Facility: CLINIC | Age: 34
Discharge: HOME | End: 2024-12-27
Admitting: PHYSICIAN ASSISTANT

## 2024-12-27 VITALS
OXYGEN SATURATION: 97 % | BODY MASS INDEX: 20.96 KG/M2 | DIASTOLIC BLOOD PRESSURE: 56 MMHG | TEMPERATURE: 98.1 F | RESPIRATION RATE: 18 BRPM | HEART RATE: 65 BPM | HEIGHT: 61 IN | SYSTOLIC BLOOD PRESSURE: 115 MMHG | WEIGHT: 111 LBS

## 2024-12-27 DIAGNOSIS — R51.9 HEADACHE: ICD-10-CM

## 2024-12-27 DIAGNOSIS — R10.2 SUPRAPUBIC ABDOMINAL PAIN: ICD-10-CM

## 2024-12-27 DIAGNOSIS — L65.9 ALOPECIA: ICD-10-CM

## 2024-12-27 LAB
ALBUMIN SERPL BCG-MCNC: 4.4 G/DL (ref 3.5–5.2)
ALBUMIN UR-MCNC: NEGATIVE MG/DL
ALP SERPL-CCNC: 58 U/L (ref 40–150)
ALT SERPL W P-5'-P-CCNC: 11 U/L (ref 0–50)
ANION GAP SERPL CALCULATED.3IONS-SCNC: 11 MMOL/L (ref 7–15)
APPEARANCE UR: CLEAR
AST SERPL W P-5'-P-CCNC: 21 U/L (ref 0–45)
BACTERIA #/AREA URNS HPF: ABNORMAL /HPF
BILIRUB DIRECT SERPL-MCNC: <0.2 MG/DL (ref 0–0.3)
BILIRUB SERPL-MCNC: 0.4 MG/DL
BILIRUB UR QL STRIP: NEGATIVE
BUN SERPL-MCNC: 8.8 MG/DL (ref 6–20)
CALCIUM SERPL-MCNC: 9.8 MG/DL (ref 8.8–10.4)
CHLORIDE SERPL-SCNC: 105 MMOL/L (ref 98–107)
COLOR UR AUTO: COLORLESS
CREAT SERPL-MCNC: 0.64 MG/DL (ref 0.51–0.95)
EGFRCR SERPLBLD CKD-EPI 2021: >90 ML/MIN/1.73M2
ERYTHROCYTE [DISTWIDTH] IN BLOOD BY AUTOMATED COUNT: 11.7 % (ref 10–15)
GLUCOSE SERPL-MCNC: 91 MG/DL (ref 70–99)
GLUCOSE UR STRIP-MCNC: NEGATIVE MG/DL
HCG UR QL: NEGATIVE
HCO3 SERPL-SCNC: 23 MMOL/L (ref 22–29)
HCT VFR BLD AUTO: 42.2 % (ref 35–47)
HGB BLD-MCNC: 14.3 G/DL (ref 11.7–15.7)
HGB UR QL STRIP: NEGATIVE
KETONES UR STRIP-MCNC: NEGATIVE MG/DL
LEUKOCYTE ESTERASE UR QL STRIP: NEGATIVE
LIPASE SERPL-CCNC: 75 U/L (ref 13–60)
MCH RBC QN AUTO: 31 PG (ref 26.5–33)
MCHC RBC AUTO-ENTMCNC: 33.9 G/DL (ref 31.5–36.5)
MCV RBC AUTO: 92 FL (ref 78–100)
MUCOUS THREADS #/AREA URNS LPF: PRESENT /LPF
NITRATE UR QL: NEGATIVE
PH UR STRIP: 5.5 [PH] (ref 5–7)
PLATELET # BLD AUTO: 172 10E3/UL (ref 150–450)
POTASSIUM SERPL-SCNC: 4.8 MMOL/L (ref 3.4–5.3)
PROT SERPL-MCNC: 7.3 G/DL (ref 6.4–8.3)
RBC # BLD AUTO: 4.61 10E6/UL (ref 3.8–5.2)
RBC URINE: 0 /HPF
SODIUM SERPL-SCNC: 139 MMOL/L (ref 135–145)
SP GR UR STRIP: 1.01 (ref 1–1.03)
SQUAMOUS EPITHELIAL: 3 /HPF
TSH SERPL DL<=0.005 MIU/L-ACNC: 2.89 UIU/ML (ref 0.3–4.2)
UROBILINOGEN UR STRIP-MCNC: <2 MG/DL
WBC # BLD AUTO: 5.1 10E3/UL (ref 4–11)
WBC URINE: <1 /HPF

## 2024-12-27 PROCEDURE — 36415 COLL VENOUS BLD VENIPUNCTURE: CPT | Performed by: PHYSICIAN ASSISTANT

## 2024-12-27 PROCEDURE — 81025 URINE PREGNANCY TEST: CPT | Performed by: PHYSICIAN ASSISTANT

## 2024-12-27 PROCEDURE — 82040 ASSAY OF SERUM ALBUMIN: CPT | Performed by: PHYSICIAN ASSISTANT

## 2024-12-27 PROCEDURE — 84443 ASSAY THYROID STIM HORMONE: CPT | Performed by: PHYSICIAN ASSISTANT

## 2024-12-27 PROCEDURE — 85014 HEMATOCRIT: CPT | Performed by: PHYSICIAN ASSISTANT

## 2024-12-27 PROCEDURE — 82248 BILIRUBIN DIRECT: CPT | Performed by: PHYSICIAN ASSISTANT

## 2024-12-27 PROCEDURE — 82947 ASSAY GLUCOSE BLOOD QUANT: CPT | Performed by: PHYSICIAN ASSISTANT

## 2024-12-27 PROCEDURE — 99284 EMERGENCY DEPT VISIT MOD MDM: CPT | Mod: 25

## 2024-12-27 PROCEDURE — 250N000011 HC RX IP 250 OP 636: Performed by: PHYSICIAN ASSISTANT

## 2024-12-27 PROCEDURE — 83690 ASSAY OF LIPASE: CPT | Performed by: PHYSICIAN ASSISTANT

## 2024-12-27 PROCEDURE — 96374 THER/PROPH/DIAG INJ IV PUSH: CPT

## 2024-12-27 PROCEDURE — 82435 ASSAY OF BLOOD CHLORIDE: CPT | Performed by: PHYSICIAN ASSISTANT

## 2024-12-27 PROCEDURE — 81001 URINALYSIS AUTO W/SCOPE: CPT | Performed by: PHYSICIAN ASSISTANT

## 2024-12-27 RX ORDER — KETOROLAC TROMETHAMINE 15 MG/ML
15 INJECTION, SOLUTION INTRAMUSCULAR; INTRAVENOUS ONCE
Status: COMPLETED | OUTPATIENT
Start: 2024-12-27 | End: 2024-12-27

## 2024-12-27 RX ADMIN — KETOROLAC TROMETHAMINE 15 MG: 15 INJECTION, SOLUTION INTRAMUSCULAR; INTRAVENOUS at 13:19

## 2024-12-27 NOTE — ED TRIAGE NOTES
PT notes a headache for several days. She also states she has lower abd pain. PT states she is unsure if she is pregnant. Last menstrual cycle was 12/15.       Triage Assessment (Adult)       Row Name 12/27/24 1033          Triage Assessment    Airway WDL WDL        Respiratory WDL    Respiratory WDL WDL        Skin Circulation/Temperature WDL    Skin Circulation/Temperature WDL WDL        Cardiac WDL    Cardiac WDL WDL        Peripheral/Neurovascular WDL    Capillary Refill, General less than/equal to 3 secs        Cognitive/Neuro/Behavioral WDL    Cognitive/Neuro/Behavioral WDL WDL        [place] Coma Scale    Best Eye Response 4-->(E4) spontaneous     Best Motor Response 6-->(M6) obeys commands     Best Verbal Response 5-->(V5) oriented     [place] Coma Scale Score 15

## 2024-12-27 NOTE — ED PROVIDER NOTES
EMERGENCY DEPARTMENT ENCOUNTER      NAME: Nakita Cota  AGE: 34 year old female  YOB: 1990  MRN: [id number]  EVALUATION DATE & TIME: No admission date for patient encounter.    PCP: No Ref-Primary, Physician    ED PROVIDER: Billie Bonilla PA-C      Chief Complaint   Patient presents with    Headache         FINAL IMPRESSION:  1. Headache    2. Alopecia    3. Suprapubic abdominal pain          ED COURSE & MEDICAL DECISION MAKIN:10 AM I introduced myself to patient, performed initial HPI and examination.     34 year old female with no pertinent PMH presents to the Emergency Department for evaluation of headache x 2 days, lower abdominal pain/cramping x 3 days, and alopecia x 1 week. No fevers, chills, no URI symptoms, no changes in bowel movements, no dysuria, no vaginal bleeding or discharge.    VSS, afebrile.  Exam with well appearing female, no acute distress. No focal neurologic deficits. Does have 2 small patches of alopecia without associated skin changes, no broken hair follicles, nontender. Mild suprapubic abdominal tenderness without guarding or rebound.     Labs with no leukocytosis, no anemia. No electrolyte derangement, no MARIANN. LFT & Lipase grossly unremarkable (lipase mildly elevated, 75). TSH WNL (2.89). UA unremarkable. Pregnancy negative.     Suspect possible underlying viral syndrome.  Not consistent with meningitis, intracranial hemorrhage, mass, or other catastrophic intracranial etiology.  Mild suprapubic abdominal tenderness without guarding or rebound, feels like menstrual cramping.  Not consistent with ectopic pregnancy, ovarian cyst or torsion.  No discharge to suggest PID/tubo-ovarian abscess.  No other associated symptoms but was unable to go and with reassuring labs, imaging deferred at this time.  Alopecia does not appear consistent with fungal infection    Instructed on at home supportive management, follow-up with PCP and red flags/indications to  return to the emergency department.  Discharged in stable and ambulatory condition.      Medical Decision Making  Obtained supplemental history:Supplemental history obtained?: No  Reviewed external records: External records reviewed?: No  Care impacted by chronic illness:Documented in Chart  Care significantly affected by social determinants of health:N/A  Did you consider but not order tests?: Work up considered but not performed and documented in chart, if applicable  Did you interpret images independently?: Independent interpretation of ECG and images noted in documentation, when applicable.  Consultation discussion with other provider:Did you involve another provider (consultant, , pharmacy, etc.)?: No  Discharge. No recommendations on prescription strength medication(s). See documentation for any additional details.  Not Applicable        MEDICATIONS GIVEN IN THE EMERGENCY:  Medications   ketorolac (TORADOL) injection 15 mg (15 mg Intravenous $Given 12/27/24 8501)       NEW PRESCRIPTIONS STARTED AT TODAY'S ER VISIT  There are no discharge medications for this patient.         =================================================================    HPI    Patient information was obtained from: Patient, significant other    Use of : Clifton Griffith        Nakita Cota is a 34 year old female with no pertinent PMH who presents to this ED by private car for evaluation of headache, lower abdominal pain.     Reports hair loss for the past week, has 2 bald patches which are new.  No history of thyroid problems.    Abdominal pain started 3 days ago, suprapubic and more consistent with cramping like her menstrual cycle.  Denies any nausea, changes in bowel movements, urinary symptoms.  No history of abdominal surgeries.  Last menstrual cycle was December 15, reports they have been irregular for the last 4 months.    Also reports headache since last night.  Dizziness for 2 days.  No vision changes.   "No nasal congestion, sore throat or cough.    Tried tylenol last night without relief.     REVIEW OF SYSTEMS   ROS negative unless otherwise stated in HPI    PAST MEDICAL HISTORY:  No past medical history on file.    PAST SURGICAL HISTORY:  No past surgical history on file.    CURRENT MEDICATIONS:    No current outpatient medications on file.      ALLERGIES:  No Known Allergies    FAMILY HISTORY:  No family history on file.    SOCIAL HISTORY:   Social History     Socioeconomic History    Marital status:    Tobacco Use    Smoking status: Never    Smokeless tobacco: Never   Substance and Sexual Activity    Alcohol use: Never    Drug use: Never       VITALS:  /56   Pulse 65   Temp 98.1  F (36.7  C)   Resp 18   Ht 1.549 m (5' 1\")   Wt 50.3 kg (111 lb)   LMP 12/15/2024   SpO2 97%   BMI 20.97 kg/m      PHYSICAL EXAM    Constitutional: Well developed, Well nourished, NAD, GCS 15   HENT: Normocephalic, Atraumatic, Oropharynx clear. 2 patches of alopecia to the back of the head, measuring 50 cent piece and nickel size without associated broken hair follicles, no other skin changes  Neck- Supple, Nontender. Normal ROM. No stridor.  Eyes: Conjunctiva normal. PERRL. EOM intact.   Respiratory: No respiratory distress, speaking in full sentences. Normal breath sounds, No wheezing, No cough  Cardiovascular: Normal heart rate, Regular rhythm, No murmurs  GI: Soft, mild suprapubic abdominal tenderness, No distention or masses. No CVA tenderness.   Musculoskeletal: No deformities, Moves all extremities equally. No calf tenderness or swelling.  Integument: Warm, Dry, No erythema, ecchymosis, or rash.  Neurologic: Alert & oriented x 3, Normal sensory function. No focal deficits.   Psychiatric: Affect normal, Judgment normal, Mood normal. Cooperative.      LAB:  All pertinent labs reviewed and interpreted.  Results for orders placed or performed during the hospital encounter of 12/27/24   CBC (+ platelets, no diff) "   Result Value Ref Range    WBC Count 5.1 4.0 - 11.0 10e3/uL    RBC Count 4.61 3.80 - 5.20 10e6/uL    Hemoglobin 14.3 11.7 - 15.7 g/dL    Hematocrit 42.2 35.0 - 47.0 %    MCV 92 78 - 100 fL    MCH 31.0 26.5 - 33.0 pg    MCHC 33.9 31.5 - 36.5 g/dL    RDW 11.7 10.0 - 15.0 %    Platelet Count 172 150 - 450 10e3/uL   Basic metabolic panel   Result Value Ref Range    Sodium 139 135 - 145 mmol/L    Potassium 4.8 3.4 - 5.3 mmol/L    Chloride 105 98 - 107 mmol/L    Carbon Dioxide (CO2) 23 22 - 29 mmol/L    Anion Gap 11 7 - 15 mmol/L    Urea Nitrogen 8.8 6.0 - 20.0 mg/dL    Creatinine 0.64 0.51 - 0.95 mg/dL    GFR Estimate >90 >60 mL/min/1.73m2    Calcium 9.8 8.8 - 10.4 mg/dL    Glucose 91 70 - 99 mg/dL   TSH with free T4 reflex   Result Value Ref Range    TSH 2.89 0.30 - 4.20 uIU/mL   Hepatic function panel   Result Value Ref Range    Protein Total 7.3 6.4 - 8.3 g/dL    Albumin 4.4 3.5 - 5.2 g/dL    Bilirubin Total 0.4 <=1.2 mg/dL    Alkaline Phosphatase 58 40 - 150 U/L    AST 21 0 - 45 U/L    ALT 11 0 - 50 U/L    Bilirubin Direct <0.20 0.00 - 0.30 mg/dL   Result Value Ref Range    Lipase 75 (H) 13 - 60 U/L   UA with Microscopic reflex to Culture    Specimen: Urine, Midstream   Result Value Ref Range    Color Urine Colorless Colorless, Straw, Light Yellow, Yellow    Appearance Urine Clear Clear    Glucose Urine Negative Negative mg/dL    Bilirubin Urine Negative Negative    Ketones Urine Negative Negative mg/dL    Specific Gravity Urine 1.009 1.001 - 1.030    Blood Urine Negative Negative    pH Urine 5.5 5.0 - 7.0    Protein Albumin Urine Negative Negative mg/dL    Urobilinogen Urine <2.0 <2.0 mg/dL    Nitrite Urine Negative Negative    Leukocyte Esterase Urine Negative Negative    Bacteria Urine Few (A) None Seen /HPF    Mucus Urine Present (A) None Seen /LPF    RBC Urine 0 <=2 /HPF    WBC Urine <1 <=5 /HPF    Squamous Epithelials Urine 3 (H) <=1 /HPF   HCG qualitative urine   Result Value Ref Range    hCG Urine  Qualitative Negative Negative       RADIOLOGY:  Reviewed all pertinent imaging. Please see official radiology report.  No orders to display       EKG:    None    PROCEDURES:   None        Billie Bonilla PA-C  Emergency Medicine  [facility] EMERGENCY ROOM  [address] [facility] [address] [facility] [address]  [phone]             Billie Bonilla PA-C  12/27/24 1919

## 2024-12-27 NOTE — Clinical Note
Nakita Cota was seen and treated in our emergency department on 12/27/2024.  She may return to work on 12/30/2024.       If you have any questions or concerns, please don't hesitate to call.      Billie Bonilla PA-C

## 2024-12-27 NOTE — DISCHARGE INSTRUCTIONS
Your labs are all reassuring.  It is not clear what caused your symptoms.  Certainly could be due to a viral infection that should get better in the next couple days    Make sure you are drinking plenty of fluids to stay hydrated.  Use Tylenol and ibuprofen as needed for pain.  Follow-up in clinic early next week for recheck.  Return to the emergency department if you develop any new or worsening symptoms.  We would be happy to see you.